# Patient Record
Sex: MALE | Race: WHITE | URBAN - METROPOLITAN AREA
[De-identification: names, ages, dates, MRNs, and addresses within clinical notes are randomized per-mention and may not be internally consistent; named-entity substitution may affect disease eponyms.]

---

## 2017-07-01 ENCOUNTER — HOSPITAL ENCOUNTER (EMERGENCY)
Facility: CLINIC | Age: 52
Discharge: HOME | End: 2017-07-01
Attending: EMERGENCY MEDICINE | Admitting: EMERGENCY MEDICINE
Payer: COMMERCIAL

## 2017-07-01 VITALS
SYSTOLIC BLOOD PRESSURE: 142 MMHG | TEMPERATURE: 98 F | HEART RATE: 92 BPM | RESPIRATION RATE: 19 BRPM | OXYGEN SATURATION: 94 % | DIASTOLIC BLOOD PRESSURE: 90 MMHG | WEIGHT: 235 LBS | HEIGHT: 75 IN | BODY MASS INDEX: 29.22 KG/M2

## 2017-07-01 DIAGNOSIS — T78.2XXA ANAPHYLAXIS, INITIAL ENCOUNTER: ICD-10-CM

## 2017-07-01 PROCEDURE — 25000125 ZZHC RX 250: Performed by: EMERGENCY MEDICINE

## 2017-07-01 PROCEDURE — 25000128 H RX IP 250 OP 636: Performed by: EMERGENCY MEDICINE

## 2017-07-01 PROCEDURE — 94640 AIRWAY INHALATION TREATMENT: CPT

## 2017-07-01 PROCEDURE — S0028 INJECTION, FAMOTIDINE, 20 MG: HCPCS | Performed by: EMERGENCY MEDICINE

## 2017-07-01 PROCEDURE — 96361 HYDRATE IV INFUSION ADD-ON: CPT

## 2017-07-01 PROCEDURE — 96365 THER/PROPH/DIAG IV INF INIT: CPT

## 2017-07-01 PROCEDURE — 99284 EMERGENCY DEPT VISIT MOD MDM: CPT | Mod: 25

## 2017-07-01 PROCEDURE — 96375 TX/PRO/DX INJ NEW DRUG ADDON: CPT

## 2017-07-01 PROCEDURE — 99285 EMERGENCY DEPT VISIT HI MDM: CPT | Performed by: EMERGENCY MEDICINE

## 2017-07-01 RX ORDER — ONDANSETRON 2 MG/ML
4 INJECTION INTRAMUSCULAR; INTRAVENOUS ONCE
Status: COMPLETED | OUTPATIENT
Start: 2017-07-01 | End: 2017-07-01

## 2017-07-01 RX ORDER — EPINEPHRINE 0.3 MG/.3ML
0.3 INJECTION SUBCUTANEOUS
Qty: 0.6 ML | Refills: 3 | Status: SHIPPED | OUTPATIENT
Start: 2017-07-01

## 2017-07-01 RX ORDER — METHYLPREDNISOLONE SODIUM SUCCINATE 125 MG/2ML
125 INJECTION, POWDER, LYOPHILIZED, FOR SOLUTION INTRAMUSCULAR; INTRAVENOUS ONCE
Status: COMPLETED | OUTPATIENT
Start: 2017-07-01 | End: 2017-07-01

## 2017-07-01 RX ORDER — IPRATROPIUM BROMIDE AND ALBUTEROL SULFATE 2.5; .5 MG/3ML; MG/3ML
3 SOLUTION RESPIRATORY (INHALATION) ONCE
Status: COMPLETED | OUTPATIENT
Start: 2017-07-01 | End: 2017-07-01

## 2017-07-01 RX ORDER — PREDNISONE 20 MG/1
40 TABLET ORAL DAILY
Qty: 10 TABLET | Refills: 0 | Status: SHIPPED | OUTPATIENT
Start: 2017-07-01 | End: 2017-07-06

## 2017-07-01 RX ADMIN — FAMOTIDINE 20 MG: 20 INJECTION, SOLUTION INTRAVENOUS at 19:47

## 2017-07-01 RX ADMIN — SODIUM CHLORIDE 1000 ML: 9 INJECTION, SOLUTION INTRAVENOUS at 19:42

## 2017-07-01 RX ADMIN — IPRATROPIUM BROMIDE AND ALBUTEROL SULFATE 3 ML: .5; 3 SOLUTION RESPIRATORY (INHALATION) at 20:01

## 2017-07-01 RX ADMIN — ONDANSETRON 4 MG: 2 INJECTION INTRAMUSCULAR; INTRAVENOUS at 20:26

## 2017-07-01 RX ADMIN — METHYLPREDNISOLONE SODIUM SUCCINATE 125 MG: 125 INJECTION, POWDER, FOR SOLUTION INTRAMUSCULAR; INTRAVENOUS at 19:47

## 2017-07-01 NOTE — ED AVS SNAPSHOT
[facility]    [address] [facility] [address]    Phone:  [phone]    Fax:  [phone]                                       Carlitos Freeman   MRN: [id number]    Department:  [facility]   Date of Visit:  7/1/2017           Patient Information     Date Of Birth          1965        Your diagnoses for this visit were:     Anaphylaxis, initial encounter        You were seen by Jf Neil MD.      Follow-up Information     Call [facility].    Specialty:  Allergy    Contact information:    [address] [facility] [place] [address] [phone]    Additional information:    The medical center is located at   [address] ([address] in [place], [place] miles north   of [place]).        Discharge Instructions         Anaphylaxis  Anaphylaxis is a severe allergic reaction that can be life threatening. This reaction can happen in a few minutes, or a few hours after exposure to what you are allergic to. Some people are more prone to this than others.  The symptoms of an anaphylactic reaction may at first seem similar to other allergic reactions. If this has happened to you in the past, do not let the initial mild symptoms, such as a rash, hives and itching, mislead you. Your reaction can worsen very quickly and become much more severe and life threatening within minutes.  More severe symptoms include:    Trouble swallowing, feeling like your throat is closing    Trouble breathing, wheezing    Hoarse voice or trouble speaking    Nausea, vomiting, diarrhea, stomach cramps    Feeling faint or lightheaded, rapid heart rate, low blood pressure    Becoming very drowsy, poorly responsive or trouble awakening  Sometimes the cause may be obvious, like knowing you are allergic to peanuts. To help identify your allergen, remember:    When it started    What you were doing at the time or just before  that    Any activities you were involved in    Any new products or contacts   Here are some common causes, but remember almost anything can cause a reaction, and you may not even be aware that you came into contact with one of these things.    Dust, mold, pollen    Plants, such as poison ivy and poison oak    Animals    Foods such as shrimp, shellfish, peanuts, milk products, gluten, eggs; also colorings, flavorings, additives    Insect bites or stings such as bees, mosquitos, flees, ticks    Medicines such as penicillin, sulfa drugs, amoxicillin, aspirin, ibuprofen; any medicine can cause a reaction    Jewelry such as nickel, or gold (new, or something you ve worn for a while including zippers, and buttons)    Latex such as in gloves, clothes, toys, balloons, or some tapes (some people allergic to latex may also  have problems with foods like bananas, avocados, kiwi, papaya, or chestnuts)    Lotions, perfumes, cosmetics, soaps, shampoos, skincare products, nail products    Chemicals or dyes in clothing, linen, , hair dyes, soaps, iodine  If you are exposed to the same substance again, you may have the same or more severe reaction. Treatment for anaphylaxis is epinephrine (adrenalin). This is available by prescription as a self-injectable pen. If the cause of your reaction is known, you should avoid exposure in the future. If the cause is not known, follow up with your doctor for special testing to determine what you are allergic to.     Home care  If was safe for you to go home, watch for any worsening of symptoms. You may need to be treated again.  Medications  Injectable epinephrine  One of the key tools in treating anaphylaxis is early use of epinephrine. If you had a severe allergic or anaphylactic reaction, the doctor may prescribe a self- injectable epinephrine kit. If this was prescribed, carry it at all times. It can be life saving. Epinephrine can help stop the progression of an allergic reaction.  Its effects are brief, so after you use the medicine,  it is still very  important to call 911 and get to an emergency room.  When to use injectable epinephrine. Use the epinephrine if you have a history of severe reactions or any of the following symptoms:    Swelling in your mouth or throat     Trouble speaking or swallowing    Trouble breathing    Feeling faint, low blood pressure, or becoming drowsy or poorly responsive    Worsening rash  How to use injectable epinephrine:    Hold the syringe firmly in your hand with the orange (or black) needle end away from your thumb    Be careful not to stick your fingers or hand with the needle!    At the opposite end, pull off the activation cap- the blue or grey tab    Holding the syringe tightly, jab it into the outer part of your upper thigh. This is one of the softest, fleshiest parts of the upper leg, and is not near a major blood vessel or nerve. Be careful not to inject it into your hip or any place that there is a pulse.    You can inject it through pants, but make sure not to inject it into the seam of the pants.    Do not pull it out right away. Try to hold the needle in place for 10 seconds.    Massage the spot for a few seconds.    If you are injecting it in someone else or a child, try to hold them or their leg still. If they jerk or yank their legs away as you are doing it, it can cause a cut on their leg.  You may feel shaky, jittery, nervous, and anxious after the injection. Although it is difficult, try to relax. This is a side effect of the epinephrine, and should stop after a few minutes   Important    Get to the emergency room after using the epinephrine. Its affect will wear off, and you may have a second reaction. This could even happen hours later.    Never intentionally eat, use, or expose yourself to the substance that caused the anaphylactic reaction.  Nothing is foolproof, including the injectable epinephrine.  Other medications  The doctor  may prescribe medicine to relieve swelling, itching, and pain. Follow the doctor s instructions when this medicine.     Oral Benadryl (diphenhydramine) is an antihistamine available at drug and grocery stores. Unless a prescription antihistamine was given, Benadryl may be used to reduce itching if large areas of the skin are involved. It may make you sleepy, so be careful using it in the daytime or when going to school, working, or driving.     Do not use Benadryl cream on your skin, because in some people it can cause a further reaction, and make you allergic to Benadryl.    You may use over-the-counter acetaminophen or ibuprofen to control pain, unless another pain medicine was prescribed.    If you were prescribed any medicines to prevent symptoms from returning, be sure to take them exactly as directed.  General care    Rest at home for the next 24 hours.    Avoid tobacco and alcohol consumption. These may worsen your symptoms.    If you know what caused your reaction today, avoid that in the future since the next reaction may be worse. Let your family members, friends and personal physician know about your allergic reaction.    If your allergy was to food, learn how to read food labels so you can check for that ingredient. If a product does not have a label, it is best to avoid it.    Consider carrying an identification card or getting a medical alert bracelet to inform medical personnel of your condition in case you cannot tell them.    Tell all of  your healthcare providers know you had an anphylactic reaction.  Make certain the information is added to your electronic or paper medical records.  Follow-up care  Follow up with your doctor or as advised if you are not improving over the next 1 to 2 days.  Call 911  Call 911 if any of these occur:    Trouble breathing or swallowing, wheezing    Hoarse voice or trouble speaking    Chest pain    Confused    Very drowsy or trouble awakening    Fainting or loss of  consciousness    Rapid heart rate    Vomiting blood, or large amounts of blood in stool    Seizure  When to seek medical advice  Call your healthcare provider right away if any of these occur:    Worsening of your symptoms    Swelling in the mouth or face    Dizziness, weakness  Date Last Reviewed: 7/30/2015 2000-2017 [employer]. [address] [place] [address]. All rights reserved. This information is not intended as a substitute for professional medical care. Always follow your healthcare professional's instructions.          24 Hour Appointment Hotline       To make an appointment at any [facility], call [phone] ([phone]). If you don't have a family doctor or clinic, we will help you find one. [facility] are conveniently located to serve the needs of you and your family.             Review of your medicines      START taking        Dose / Directions Last dose taken    EPINEPHrine 0.3 MG/0.3ML injection   Dose:  0.3 mg   Quantity:  0.6 mL        Inject 0.3 mLs (0.3 mg) into the muscle once as needed for anaphylaxis   Refills:  3        predniSONE 20 MG tablet   Commonly known as:  DELTASONE   Dose:  40 mg   Quantity:  10 tablet        Take 2 tablets (40 mg) by mouth daily for 5 days   Refills:  0                Prescriptions were sent or printed at these locations (2 Prescriptions)                   Other Prescriptions                Printed at Department/Unit printer (2 of 2)         predniSONE (DELTASONE) 20 MG tablet               EPINEPHrine 0.3 MG/0.3ML injection                Orders Needing Specimen Collection     None      Pending Results     No orders found from 6/29/2017 to 7/2/2017.            Pending Culture Results     No orders found from 6/29/2017 to 7/2/2017.            Pending Results Instructions     If you had any lab results that were not finalized at the time of your Discharge, you can call the ED Lab Result RN at [phone]. You will be  "contacted by this team for any positive Lab results or changes in treatment. The nurses are available 7 days a week from 10A to 6:30P.  You can leave a message 24 hours per day and they will return your call.        Test Results From Your Hospital Stay               Thank you for choosing [facility]       Thank you for choosing [facility] for your care. Our goal is always to provide you with excellent care. Hearing back from our patients is one way we can continue to improve our services. Please take a few minutes to complete the written survey that you may receive in the mail after you visit with us. Thank you!        [employer] Information     [employer] lets you send messages to your doctor, view your test results, renew your prescriptions, schedule appointments and more. To sign up, go to [email or web address] . Click on \"Log in\" on the left side of the screen, which will take you to the Welcome page. Then click on \"Sign up Now\" on the right side of the page.     You will be asked to enter the access code listed below, as well as some personal information. Please follow the directions to create your username and password.     Your access code is: [email or web address]  Expires: 2017  9:53 PM     Your access code will  in 90 days. If you need help or a new code, please call your [facility] clinic or [phone].        Care EveryWhere ID     This is your Care EveryWhere ID. This could be used by other organizations to access your [facility] medical records  [id number]        Equal Access to Services     CHELLY ROBERTS AH: Hadii mari palacios Soyomaira, waaxda lurolandadaha, qaybta kaalmada gia, meryl zhao . So [facility] [phone].    ATENCIÓN: Si habla español, tiene a welsh disposición servicios gratuitos de asistencia lingüística. Llame al [phone].    We comply with applicable federal civil rights laws and [place] laws. We do not discriminate on the basis of race, color, national origin, " age, disability sex, sexual orientation or gender identity.            After Visit Summary       This is your record. Keep this with you and show to your community pharmacist(s) and doctor(s) at your next visit.

## 2017-07-01 NOTE — ED AVS SNAPSHOT
[facility]    [address] [address]    Phone:  [phone]    Fax:  [phone]                                       Carlitos Freeman   MRN: [id number]    Department:  [facility]   Date of Visit:  7/1/2017           After Visit Summary Signature Page     I have received my discharge instructions, and my questions have been answered. I have discussed any challenges I see with this plan with the nurse or doctor.    ..........................................................................................................................................  Patient/Patient Representative Signature      ..........................................................................................................................................  Patient Representative Print Name and Relationship to Patient    ..................................................               ................................................  Date                                            Time    ..........................................................................................................................................  Reviewed by Signature/Title    ...................................................              ..............................................  Date                                                            Time

## 2017-07-02 NOTE — ED PROVIDER NOTES
"Chief complaint allergic reaction    51-year-old male with a history of idiopathic urticaria, reportedly admitted for 4 days for severe urticaria/angioedema edema.  \"They ran all the tests and everything came up negative\".  At that time there was no trigger.  Today however he was stung by what he believes was a horsefly of the right forehead.  Fairly abrupt swelling right face, tightness in his throat, nausea, feeling as if he had to defecate, mild shortness of air.  No syncope or near-syncope.  Benadryl 75 mg given approximately 1 hour prior to presentation.  Incident occurred approximately 2 hours prior to presentation.    Past medical history, medications, allergies, social history, family history all reviewed.  ROS: All other systems reviewed and are negative.    /78  Pulse 92  Temp 98  F (36.7  C) (Temporal)  Resp 17  Ht 1.905 m (6' 3\")  Wt 106.6 kg (235 lb)  SpO2 96%  BMI 29.37 kg/m2  Nontoxic appearing no respiratory distress alert and oriented ×3  Head atraumatic normocephalic  Right face moderate swelling involving the forehead, periorbital region, cheek and upper lip  Conjunctiva noninjected, oropharynx moist without lesions or erythema, mild angioedema of the soft palate  No cervical adenopathy neck supple full active painless range of motion  Lungs clear to auscultation with exception of scant distant wheeze  Heart regular no murmur  Abdomen soft nontender bowel sounds positive no masses or HSM  Strength and sensation grossly intact throughout the extremities, gait and station normal  Speech is fluent, good eye contact, thought processes are rational  Several urticarial lesions over the back    Medications   EPINEPHrine (ADRENALIN) 1 MG/ML kit (not administered)   EPINEPHrine (ADRENALIN) injection 0.5 mg (not administered)   famotidine (PEPCID) infusion 20 mg (0 mg Intravenous Stopped 7/1/17 2025)   0.9% sodium chloride BOLUS (1,000 mLs Intravenous New Bag 7/1/17 1942)   methylPREDNISolone " sodium succinate (solu-MEDROL) injection 125 mg (125 mg Intravenous Given 7/1/17 1947)   ipratropium - albuterol 0.5 mg/2.5 mg/3 mL (DUONEB) neb solution 3 mL (3 mLs Nebulization Given 7/1/17 2001)   ondansetron (ZOFRAN) injection 4 mg (4 mg Intravenous Given 7/1/17 2026)     Patient reevaluated at 2015, slow resolution of facial swelling, and tightness in his throat, complains of mild nausea.  Zofran ordered.    Patient reevaluated at 2140, continued resolution of facial swelling, oropharynx unremarkable, lungs clear, skin clear.    MDM: 51-year-old male anaphylaxis secondary to insect bite right face.  Treated as above, symptoms slowly resolving.  Distant history of same in 2011 with hospitalization ×4 days, several episodes of allergic reaction since that time, most recently during laser surgery.  No history of familial lymphedema.  Discharge with prescription for prednisone 40 mg daily ×5 days, EpiPen, recommended OTC antihistamines, number for allergy clinic dispensed.  Return criteria reviewed.    Impression: Anaphylaxis     Jf Neil MD  07/01/17 9257

## 2017-07-02 NOTE — ED NOTES
Pt remains a/o x 4, states his throat is feeling better, less tightness. Swelling seems to be about the same. Monitor

## 2017-07-02 NOTE — DISCHARGE INSTRUCTIONS
Anaphylaxis  Anaphylaxis is a severe allergic reaction that can be life threatening. This reaction can happen in a few minutes, or a few hours after exposure to what you are allergic to. Some people are more prone to this than others.  The symptoms of an anaphylactic reaction may at first seem similar to other allergic reactions. If this has happened to you in the past, do not let the initial mild symptoms, such as a rash, hives and itching, mislead you. Your reaction can worsen very quickly and become much more severe and life threatening within minutes.  More severe symptoms include:    Trouble swallowing, feeling like your throat is closing    Trouble breathing, wheezing    Hoarse voice or trouble speaking    Nausea, vomiting, diarrhea, stomach cramps    Feeling faint or lightheaded, rapid heart rate, low blood pressure    Becoming very drowsy, poorly responsive or trouble awakening  Sometimes the cause may be obvious, like knowing you are allergic to peanuts. To help identify your allergen, remember:    When it started    What you were doing at the time or just before that    Any activities you were involved in    Any new products or contacts   Here are some common causes, but remember almost anything can cause a reaction, and you may not even be aware that you came into contact with one of these things.    Dust, mold, pollen    Plants, such as poison ivy and poison oak    Animals    Foods such as shrimp, shellfish, peanuts, milk products, gluten, eggs; also colorings, flavorings, additives    Insect bites or stings such as bees, mosquitos, flees, ticks    Medicines such as penicillin, sulfa drugs, amoxicillin, aspirin, ibuprofen; any medicine can cause a reaction    Jewelry such as nickel, or gold (new, or something you ve worn for a while including zippers, and buttons)    Latex such as in gloves, clothes, toys, balloons, or some tapes (some people allergic to latex may also  have problems with foods like  bananas, avocados, kiwi, papaya, or chestnuts)    Lotions, perfumes, cosmetics, soaps, shampoos, skincare products, nail products    Chemicals or dyes in clothing, linen, , hair dyes, soaps, iodine  If you are exposed to the same substance again, you may have the same or more severe reaction. Treatment for anaphylaxis is epinephrine (adrenalin). This is available by prescription as a self-injectable pen. If the cause of your reaction is known, you should avoid exposure in the future. If the cause is not known, follow up with your doctor for special testing to determine what you are allergic to.     Home care  If was safe for you to go home, watch for any worsening of symptoms. You may need to be treated again.  Medications  Injectable epinephrine  One of the key tools in treating anaphylaxis is early use of epinephrine. If you had a severe allergic or anaphylactic reaction, the doctor may prescribe a self- injectable epinephrine kit. If this was prescribed, carry it at all times. It can be life saving. Epinephrine can help stop the progression of an allergic reaction. Its effects are brief, so after you use the medicine,  it is still very  important to call 911 and get to an emergency room.  When to use injectable epinephrine. Use the epinephrine if you have a history of severe reactions or any of the following symptoms:    Swelling in your mouth or throat     Trouble speaking or swallowing    Trouble breathing    Feeling faint, low blood pressure, or becoming drowsy or poorly responsive    Worsening rash  How to use injectable epinephrine:    Hold the syringe firmly in your hand with the orange (or black) needle end away from your thumb    Be careful not to stick your fingers or hand with the needle!    At the opposite end, pull off the activation cap- the blue or grey tab    Holding the syringe tightly, jab it into the outer part of your upper thigh. This is one of the softest, fleshiest parts of the upper  leg, and is not near a major blood vessel or nerve. Be careful not to inject it into your hip or any place that there is a pulse.    You can inject it through pants, but make sure not to inject it into the seam of the pants.    Do not pull it out right away. Try to hold the needle in place for 10 seconds.    Massage the spot for a few seconds.    If you are injecting it in someone else or a child, try to hold them or their leg still. If they jerk or yank their legs away as you are doing it, it can cause a cut on their leg.  You may feel shaky, jittery, nervous, and anxious after the injection. Although it is difficult, try to relax. This is a side effect of the epinephrine, and should stop after a few minutes   Important    Get to the emergency room after using the epinephrine. Its affect will wear off, and you may have a second reaction. This could even happen hours later.    Never intentionally eat, use, or expose yourself to the substance that caused the anaphylactic reaction.  Nothing is foolproof, including the injectable epinephrine.  Other medications  The doctor may prescribe medicine to relieve swelling, itching, and pain. Follow the doctor s instructions when this medicine.     Oral Benadryl (diphenhydramine) is an antihistamine available at drug and grocery stores. Unless a prescription antihistamine was given, Benadryl may be used to reduce itching if large areas of the skin are involved. It may make you sleepy, so be careful using it in the daytime or when going to school, working, or driving.     Do not use Benadryl cream on your skin, because in some people it can cause a further reaction, and make you allergic to Benadryl.    You may use over-the-counter acetaminophen or ibuprofen to control pain, unless another pain medicine was prescribed.    If you were prescribed any medicines to prevent symptoms from returning, be sure to take them exactly as directed.  General care    Rest at home for the next  24 hours.    Avoid tobacco and alcohol consumption. These may worsen your symptoms.    If you know what caused your reaction today, avoid that in the future since the next reaction may be worse. Let your family members, friends and personal physician know about your allergic reaction.    If your allergy was to food, learn how to read food labels so you can check for that ingredient. If a product does not have a label, it is best to avoid it.    Consider carrying an identification card or getting a medical alert bracelet to inform medical personnel of your condition in case you cannot tell them.    Tell all of  your healthcare providers know you had an anphylactic reaction.  Make certain the information is added to your electronic or paper medical records.  Follow-up care  Follow up with your doctor or as advised if you are not improving over the next 1 to 2 days.  Call 911  Call 911 if any of these occur:    Trouble breathing or swallowing, wheezing    Hoarse voice or trouble speaking    Chest pain    Confused    Very drowsy or trouble awakening    Fainting or loss of consciousness    Rapid heart rate    Vomiting blood, or large amounts of blood in stool    Seizure  When to seek medical advice  Call your healthcare provider right away if any of these occur:    Worsening of your symptoms    Swelling in the mouth or face    Dizziness, weakness  Date Last Reviewed: 7/30/2015 2000-2017 [employer]. [address] [place] [address], [place] [address]. All rights reserved. This information is not intended as a substitute for professional medical care. Always follow your healthcare professional's instructions.

## 2017-07-02 NOTE — ED NOTES
Was outside and a horse fly stung him in the face, he has rt side facial edema and his throat feels tight. Wife didn't use EPI pen due to being out dated and pt didn't want it. She did give him 3 benadryl total of 75mg prior to coming in. This happened 2011 after being stung by something and has had hives in the past 6 years after bug bite but nothing this bad. Pt is a/o x 4.

## 2017-07-02 NOTE — ED NOTES
Pt remains a/o x 4, face still has swelling, denies SOB and states nausea is some better, feels tired from Benadryl. Warm blanket given, lights off. SO at bedside. Continue to monitor